# Patient Record
Sex: FEMALE | Race: WHITE | NOT HISPANIC OR LATINO | Employment: UNEMPLOYED | URBAN - NONMETROPOLITAN AREA
[De-identification: names, ages, dates, MRNs, and addresses within clinical notes are randomized per-mention and may not be internally consistent; named-entity substitution may affect disease eponyms.]

---

## 2017-02-01 ENCOUNTER — TRANSCRIBE ORDERS (OUTPATIENT)
Dept: ADMINISTRATIVE | Facility: HOSPITAL | Age: 62
End: 2017-02-01

## 2017-02-01 DIAGNOSIS — N64.89 BREAST ASYMMETRY IN FEMALE: Primary | ICD-10-CM

## 2017-02-13 ENCOUNTER — HOSPITAL ENCOUNTER (OUTPATIENT)
Dept: ULTRASOUND IMAGING | Facility: HOSPITAL | Age: 62
Discharge: HOME | End: 2017-02-13

## 2017-02-13 ENCOUNTER — HOSPITAL ENCOUNTER (OUTPATIENT)
Dept: MAMMOGRAPHY | Facility: HOSPITAL | Age: 62
Discharge: HOME | End: 2017-02-13
Admitting: PHYSICIAN ASSISTANT

## 2017-02-13 DIAGNOSIS — N64.89 BREAST ASYMMETRY IN FEMALE: ICD-10-CM

## 2017-02-13 PROCEDURE — G0204 DX MAMMO INCL CAD BI: HCPCS

## 2017-02-13 PROCEDURE — 77062 BREAST TOMOSYNTHESIS BI: CPT | Performed by: RADIOLOGY

## 2017-02-13 PROCEDURE — G0279 TOMOSYNTHESIS, MAMMO: HCPCS

## 2017-02-13 PROCEDURE — 77066 DX MAMMO INCL CAD BI: CPT | Performed by: RADIOLOGY

## 2018-04-13 ENCOUNTER — HOSPITAL ENCOUNTER (OUTPATIENT)
Dept: MAMMOGRAPHY | Facility: HOSPITAL | Age: 63
Discharge: HOME | End: 2018-04-13
Admitting: PHYSICIAN ASSISTANT

## 2018-04-13 DIAGNOSIS — Z13.9 VISIT FOR SCREENING: ICD-10-CM

## 2018-04-13 PROCEDURE — 77063 BREAST TOMOSYNTHESIS BI: CPT

## 2018-04-13 PROCEDURE — 77067 SCR MAMMO BI INCL CAD: CPT | Performed by: RADIOLOGY

## 2018-04-13 PROCEDURE — 77067 SCR MAMMO BI INCL CAD: CPT

## 2018-04-13 PROCEDURE — 77063 BREAST TOMOSYNTHESIS BI: CPT | Performed by: RADIOLOGY

## 2019-04-15 ENCOUNTER — HOSPITAL ENCOUNTER (OUTPATIENT)
Dept: MAMMOGRAPHY | Facility: HOSPITAL | Age: 64
Discharge: HOME | End: 2019-04-15
Admitting: CLINIC/CENTER

## 2019-04-15 DIAGNOSIS — Z12.39 SCREENING BREAST EXAMINATION: ICD-10-CM

## 2019-04-15 PROCEDURE — 77067 SCR MAMMO BI INCL CAD: CPT

## 2019-04-15 PROCEDURE — 77067 SCR MAMMO BI INCL CAD: CPT | Performed by: RADIOLOGY

## 2019-04-15 PROCEDURE — 77063 BREAST TOMOSYNTHESIS BI: CPT | Performed by: RADIOLOGY

## 2019-04-15 PROCEDURE — 77063 BREAST TOMOSYNTHESIS BI: CPT

## 2020-06-01 ENCOUNTER — HOSPITAL ENCOUNTER (OUTPATIENT)
Dept: MAMMOGRAPHY | Facility: HOSPITAL | Age: 65
Discharge: HOME | End: 2020-06-01
Admitting: CLINIC/CENTER

## 2020-06-01 DIAGNOSIS — Z12.31 VISIT FOR SCREENING MAMMOGRAM: ICD-10-CM

## 2020-06-01 PROCEDURE — 77063 BREAST TOMOSYNTHESIS BI: CPT | Performed by: RADIOLOGY

## 2020-06-01 PROCEDURE — 77063 BREAST TOMOSYNTHESIS BI: CPT

## 2020-06-01 PROCEDURE — 77067 SCR MAMMO BI INCL CAD: CPT

## 2020-06-01 PROCEDURE — 77067 SCR MAMMO BI INCL CAD: CPT | Performed by: RADIOLOGY

## 2021-07-19 ENCOUNTER — HOSPITAL ENCOUNTER (OUTPATIENT)
Dept: MAMMOGRAPHY | Facility: HOSPITAL | Age: 66
Discharge: HOME | End: 2021-07-19
Admitting: CLINIC/CENTER

## 2021-07-19 DIAGNOSIS — Z12.31 VISIT FOR SCREENING MAMMOGRAM: ICD-10-CM

## 2021-07-19 PROCEDURE — 77067 SCR MAMMO BI INCL CAD: CPT | Performed by: RADIOLOGY

## 2021-07-19 PROCEDURE — 77067 SCR MAMMO BI INCL CAD: CPT

## 2021-07-19 PROCEDURE — 77063 BREAST TOMOSYNTHESIS BI: CPT

## 2021-07-19 PROCEDURE — 77063 BREAST TOMOSYNTHESIS BI: CPT | Performed by: RADIOLOGY

## 2022-09-23 ENCOUNTER — HOSPITAL ENCOUNTER (OUTPATIENT)
Dept: MAMMOGRAPHY | Facility: HOSPITAL | Age: 67
Discharge: HOME | End: 2022-09-23
Admitting: CLINIC/CENTER

## 2022-09-23 DIAGNOSIS — Z12.31 VISIT FOR SCREENING MAMMOGRAM: ICD-10-CM

## 2022-09-23 PROCEDURE — 77067 SCR MAMMO BI INCL CAD: CPT | Performed by: RADIOLOGY

## 2022-09-23 PROCEDURE — 77063 BREAST TOMOSYNTHESIS BI: CPT | Performed by: RADIOLOGY

## 2022-09-23 PROCEDURE — 77067 SCR MAMMO BI INCL CAD: CPT

## 2022-09-23 PROCEDURE — 77063 BREAST TOMOSYNTHESIS BI: CPT

## 2025-02-20 ENCOUNTER — OFFICE VISIT (OUTPATIENT)
Dept: GASTROENTEROLOGY | Facility: CLINIC | Age: 70
End: 2025-02-20
Payer: MEDICARE

## 2025-02-20 VITALS
WEIGHT: 173 LBS | SYSTOLIC BLOOD PRESSURE: 108 MMHG | DIASTOLIC BLOOD PRESSURE: 58 MMHG | HEART RATE: 65 BPM | HEIGHT: 63 IN | BODY MASS INDEX: 30.65 KG/M2

## 2025-02-20 DIAGNOSIS — R19.8 ALTERNATING CONSTIPATION AND DIARRHEA: ICD-10-CM

## 2025-02-20 DIAGNOSIS — K92.1 HEMATOCHEZIA: ICD-10-CM

## 2025-02-20 DIAGNOSIS — Z12.11 ENCOUNTER FOR SCREENING FOR MALIGNANT NEOPLASM OF COLON: Primary | ICD-10-CM

## 2025-02-20 DIAGNOSIS — K62.5 RECTAL BLEEDING: ICD-10-CM

## 2025-02-20 DIAGNOSIS — Z87.19 HISTORY OF DIVERTICULITIS: ICD-10-CM

## 2025-02-20 PROCEDURE — 83540 ASSAY OF IRON: CPT | Performed by: NURSE PRACTITIONER

## 2025-02-20 PROCEDURE — 85025 COMPLETE CBC W/AUTO DIFF WBC: CPT | Performed by: NURSE PRACTITIONER

## 2025-02-20 PROCEDURE — 84466 ASSAY OF TRANSFERRIN: CPT | Performed by: NURSE PRACTITIONER

## 2025-02-20 PROCEDURE — 82728 ASSAY OF FERRITIN: CPT | Performed by: NURSE PRACTITIONER

## 2025-02-20 RX ORDER — ASPIRIN 81 MG/1
81 TABLET ORAL DAILY
COMMUNITY

## 2025-02-20 RX ORDER — POLYETHYLENE GLYCOL 3350 17 G/17G
510 POWDER, FOR SOLUTION ORAL TAKE AS DIRECTED
Qty: 510 G | Refills: 0 | Status: SHIPPED | OUTPATIENT
Start: 2025-02-20

## 2025-02-20 RX ORDER — ATORVASTATIN CALCIUM 10 MG/1
10 TABLET, FILM COATED ORAL DAILY
COMMUNITY

## 2025-02-20 RX ORDER — HYDROCHLOROTHIAZIDE 50 MG/1
50 TABLET ORAL DAILY
COMMUNITY
Start: 2024-11-16

## 2025-02-20 RX ORDER — BISACODYL 5 MG/1
20 TABLET, DELAYED RELEASE ORAL ONCE
Qty: 4 TABLET | Refills: 0 | Status: SHIPPED | OUTPATIENT
Start: 2025-02-20 | End: 2025-02-20

## 2025-02-20 RX ORDER — LOSARTAN POTASSIUM 100 MG/1
100 TABLET ORAL DAILY
COMMUNITY

## 2025-02-20 RX ORDER — METOPROLOL TARTRATE 50 MG
50 TABLET ORAL 2 TIMES DAILY
COMMUNITY
Start: 2025-01-14

## 2025-02-20 RX ORDER — DILTIAZEM HYDROCHLORIDE 180 MG/1
180 CAPSULE, COATED, EXTENDED RELEASE ORAL DAILY
COMMUNITY
Start: 2024-12-18 | End: 2025-12-18

## 2025-02-20 RX ORDER — DULOXETIN HYDROCHLORIDE 30 MG/1
30 CAPSULE, DELAYED RELEASE ORAL DAILY
COMMUNITY

## 2025-02-20 NOTE — PROGRESS NOTES
DATE OF CONSULTATION:  2/20/2025    REASON FOR REFERRAL: Constipation, history of diverticulitis, hematochezia, rectal bleeding    REFERRING PHYSICIAN:  Mily Glynn APRN     CHIEF COMPLAINT:  Hematochezia, rectal bleeding, loose stool    HISTORY OF PRESENT ILLNESS:   Conchita Jim is a very pleasant 69 y.o. female who is being seen today at the request of Mily Glynn APRN  for evaluation and treatment of constipation, hematochezia, rectal bleeding and diverticulitis. Ms. Jim reports having chronic difficulty with constipation. She has previously tried over-the-counter stool softeners and laxatives without improvement.  Approximately 6 months ago, patient reports having worsening constipation and subsequently began having intermittent hematochezia and rectal bleeding a few times per week.  She reports that she continued to follow with her PCP but did not have improvement in symptoms with stool softeners.  In mid December 2024, patient reports she began having LLQ abdominal pain.  She underwent CT abdomen pelvis without contrast on 12/20/2024 which showed uncomplicated sigmoid diverticulitis.  Follow-up endoscopic correlation was recommended.  Patient reports completing treatment for diverticulitis with improvement in LLQ abdominal pain and constipation.  At present, she is not taking any stool softeners.  She reports having 5-6 BMs per day with stool type V-VII on [place] stool scale.  However, she has associated abdominal bloating and incomplete evacuation of her colon.  She continues to have mild hematochezia and rectal bleeding~1-2 times per week.  Patient is not aware of being anemic.  She reports having previous colonoscopy~4 to 5 years ago at [facility] with Dr. Bruce which was unremarkable.  She denies family history of colon cancer.  She denies unusual weight loss.  She has no other complaints today.    PAST MEDICAL HISTORY:  Past Medical History:   Diagnosis Date     "Diverticulitis of colon     Hyperlipidemia     Hypertension        PAST SURGICAL HISTORY:  Past Surgical History:   Procedure Laterality Date    HYSTERECTOMY      10 yrs ago       FAMILY HISTORY:  Family History   Problem Relation Age of Onset    Breast cancer Maternal Aunt 55       SOCIAL HISTORY:  Social History     Socioeconomic History    Marital status:        MEDICATIONS:  The current medication list was reviewed in the EMR    Current Outpatient Medications:     dilTIAZem CD (CARDIZEM CD) 180 MG 24 hr capsule, Take 1 capsule by mouth Daily., Disp: , Rfl:     hydroCHLOROthiazide 50 MG tablet, Take 1 tablet by mouth Daily., Disp: , Rfl:     metoprolol tartrate (LOPRESSOR) 50 MG tablet, Take 1 tablet by mouth 2 (Two) Times a Day., Disp: , Rfl:     aspirin 81 MG EC tablet, Take 1 tablet by mouth Daily., Disp: , Rfl:     atorvastatin (LIPITOR) 10 MG tablet, Take 1 tablet by mouth Daily., Disp: , Rfl:     bisacodyl (DULCOLAX) 5 MG EC tablet, Take 4 tablets by mouth 1 (One) Time for 1 dose. Take 4 tablets at 4:00 PM the day before procedure, Disp: 4 tablet, Rfl: 0    DULoxetine (CYMBALTA) 30 MG capsule, Take 1 capsule by mouth Daily., Disp: , Rfl:     losartan (COZAAR) 100 MG tablet, Take 1 tablet by mouth Daily., Disp: , Rfl:     polyethylene glycol (MIRALAX) 17 GM/SCOOP powder, Take 510 g by mouth Take As Directed. Place 15 cap fulls of powder in 32 oz of liquid of choice at 4:00 and 10:00 PM, Disp: 510 g, Rfl: 0    ALLERGIES:  No Known Allergies      REVIEW OF SYSTEMS:    A comprehensive 14 point review of systems was performed.  Significant findings as mentioned above.  All other systems reviewed and are negative.        Physical Exam   Vital Signs: /58 (BP Location: Left arm, Patient Position: Sitting, Cuff Size: Large Adult)   Pulse 65   Ht 160 cm (63\")   Wt 78.5 kg (173 lb)   BMI 30.65 kg/m²    General: Well developed, well nourished, alert and oriented x 3, in no acute distress.   Head: ATNC "   Eyes: PERRL, No evidence of conjunctivitis.   Nose: No nasal discharge.   Mouth: Oral mucosal membranes moist. No oral ulceration or hemorrhages.   Neck: Neck supple. No thyromegaly. No JVD.   Lungs: Clear in all fields to A&P without rales, rhonchi or wheezing.   Heart:  Regular rate and rhythm. No murmurs, rubs, or gallops.   Abdomen: Soft. Bowel sounds are normoactive. Nontender with palpation.   Extremities: No cyanosis or edema.   Neurologic: Grossly non-focal exam      IMAGING:   CT Abdomen Pelvis Without Contrast  Order: [id number]  Impression    Uncomplicated sigmoid diverticulitis. Follow-up endoscopic  correlation recommended.          Images reviewed, interpreted, and dictated by Dr. NIKKIE Stauffer.  Transcribed by Nelson Don PA-C.  Narrative    CT SCAN OF ABDOMEN AND PELVIS WITHOUT CONTRAST    HISTORY: Right upper quadrant pain    PROCEDURE: Axial images were obtained from the lung bases to the pubic  symphysis by computed tomography.  This study was performed with  techniques to keep radiation doses as low as reasonably achievable,  (ALARA). Individualized dose reduction techniques using automated  exposure control or adjustment of mA and/or kV according to the patient  size were employed.      FINDINGS:    ABDOMEN: The lung bases are clear. The heart size is normal. The limited  noncontrast images of the liver are normal except for a 30 mm hepatic  cyst. The gallbladder is mildly distended. The spleen is normal. No  adrenal masses are seen.  The aorta is normal in caliber. There is no  significant free fluid or adenopathy.   There is no nephrolithiasis or  hydronephrosis.    PELVIS: The appendix is not identified. There is diverticulosis in  sigmoid colon with mild surrounding inflammation. There is no abscess or  free air. The urinary bladder is unremarkable. There is no significant  fluid or adenopathy.  Exam End: 12/20/24 11:14    Specimen Collected: 12/20/24 16:40 Last Resulted: 12/20/24  16:46   Received From: [facility]  Result Received: 02/20/25 13:18       ASSESSMENT & PLAN:  Conchita Jim is a very pleasant 69 y.o. female with    1.  History of diverticulitis:   2.  Alternating constipation and diarrhea:  3.  Hematochezia/rectal bleeding:    -Recommended colonoscopy to evaluate the above issues.  We discussed risks/benefits and patient is agreeable to proceed.  Will schedule.  -Will check CBC, iron panel and ferritin today.  -Recommended patient start daily fiber supplement to help promote bowel regularity and reduce pressure in her colon.  Recommended starting Citrucel or Benefiber.  Also recommended she push oral hydration with water to help prevent constipation.  She can also use MiraLAX 1 capful daily as needed to help prevent straining which can exacerbate diverticular disease.  -Will have patient return to clinic following colonoscopy.    The patient was in agreement with the plan and all questions were answered to her satisfaction.     Thank you so much for allowing us to participate in the care of Conchita Jim . Please do not hesitate to contact us with any questions or concerns.           Electronically Signed by: NANCY Telles , February 20, 2025 11:21 EST       CC:   Chandana Scott MD

## 2025-02-21 ENCOUNTER — TELEPHONE (OUTPATIENT)
Dept: GASTROENTEROLOGY | Facility: CLINIC | Age: 70
End: 2025-02-21
Payer: MEDICARE

## 2025-02-21 LAB
BASOPHILS # BLD AUTO: 0.09 10*3/MM3 (ref 0–0.2)
BASOPHILS NFR BLD AUTO: 0.8 % (ref 0–1.5)
DEPRECATED RDW RBC AUTO: 40.9 FL (ref 37–54)
EOSINOPHIL # BLD AUTO: 0.51 10*3/MM3 (ref 0–0.4)
EOSINOPHIL NFR BLD AUTO: 4.5 % (ref 0.3–6.2)
ERYTHROCYTE [DISTWIDTH] IN BLOOD BY AUTOMATED COUNT: 12 % (ref 12.3–15.4)
FERRITIN SERPL-MCNC: 555 NG/ML (ref 13–150)
HCT VFR BLD AUTO: 34.3 % (ref 34–46.6)
HGB BLD-MCNC: 11.2 G/DL (ref 12–15.9)
IMM GRANULOCYTES # BLD AUTO: 0.08 10*3/MM3 (ref 0–0.05)
IMM GRANULOCYTES NFR BLD AUTO: 0.7 % (ref 0–0.5)
IRON 24H UR-MRATE: 50 MCG/DL (ref 37–145)
IRON SATN MFR SERPL: 18 % (ref 20–50)
LYMPHOCYTES # BLD AUTO: 1.59 10*3/MM3 (ref 0.7–3.1)
LYMPHOCYTES NFR BLD AUTO: 14 % (ref 19.6–45.3)
MCH RBC QN AUTO: 30.1 PG (ref 26.6–33)
MCHC RBC AUTO-ENTMCNC: 32.7 G/DL (ref 31.5–35.7)
MCV RBC AUTO: 92.2 FL (ref 79–97)
MONOCYTES # BLD AUTO: 1 10*3/MM3 (ref 0.1–0.9)
MONOCYTES NFR BLD AUTO: 8.8 % (ref 5–12)
NEUTROPHILS NFR BLD AUTO: 71.2 % (ref 42.7–76)
NEUTROPHILS NFR BLD AUTO: 8.06 10*3/MM3 (ref 1.7–7)
NRBC BLD AUTO-RTO: 0 /100 WBC (ref 0–0.2)
PLATELET # BLD AUTO: 360 10*3/MM3 (ref 140–450)
PMV BLD AUTO: 9.9 FL (ref 6–12)
RBC # BLD AUTO: 3.72 10*6/MM3 (ref 3.77–5.28)
TIBC SERPL-MCNC: 274 MCG/DL (ref 298–536)
TRANSFERRIN SERPL-MCNC: 184 MG/DL (ref 200–360)
WBC NRBC COR # BLD AUTO: 11.33 10*3/MM3 (ref 3.4–10.8)

## 2025-02-21 NOTE — TELEPHONE ENCOUNTER
I spoke to patient, notified her that per Genoveva, Please let patient know that labs from yesterday including CBC showed that she is mildly anemic.  Hemoglobin is currently 11.2.  Iron panel and ferritin not suggestive of iron deficiency anemia and most suggestive of anemia of chronic disease/inflammation.  She does not need oral iron therapy, ferritin is currently elevated 555.  Will proceed with colonoscopy as previously planned. Patient voiced understanding.

## 2025-02-21 NOTE — TELEPHONE ENCOUNTER
Please let patient know that labs from yesterday including CBC showed that she is mildly anemic.  Hemoglobin is currently 11.2.  Iron panel and ferritin not suggestive of iron deficiency anemia and most suggestive of anemia of chronic disease/inflammation.  She does not need oral iron therapy, ferritin is currently elevated 555.  Will proceed with colonoscopy as previously planned.

## 2025-04-16 ENCOUNTER — ANESTHESIA EVENT (OUTPATIENT)
Dept: PERIOP | Facility: HOSPITAL | Age: 70
End: 2025-04-16
Payer: MEDICARE

## 2025-04-16 ENCOUNTER — HOSPITAL ENCOUNTER (OUTPATIENT)
Facility: HOSPITAL | Age: 70
Setting detail: HOSPITAL OUTPATIENT SURGERY
Discharge: HOME | End: 2025-04-16
Attending: INTERNAL MEDICINE | Admitting: INTERNAL MEDICINE
Payer: MEDICARE

## 2025-04-16 ENCOUNTER — ANESTHESIA (OUTPATIENT)
Dept: PERIOP | Facility: HOSPITAL | Age: 70
End: 2025-04-16
Payer: MEDICARE

## 2025-04-16 VITALS
DIASTOLIC BLOOD PRESSURE: 72 MMHG | WEIGHT: 178 LBS | HEIGHT: 63 IN | BODY MASS INDEX: 31.54 KG/M2 | HEART RATE: 58 BPM | OXYGEN SATURATION: 98 % | RESPIRATION RATE: 16 BRPM | TEMPERATURE: 97.3 F | SYSTOLIC BLOOD PRESSURE: 142 MMHG

## 2025-04-16 DIAGNOSIS — K62.5 RECTAL BLEEDING: ICD-10-CM

## 2025-04-16 DIAGNOSIS — K92.1 HEMATOCHEZIA: ICD-10-CM

## 2025-04-16 DIAGNOSIS — Z12.11 ENCOUNTER FOR SCREENING FOR MALIGNANT NEOPLASM OF COLON: ICD-10-CM

## 2025-04-16 DIAGNOSIS — Z87.19 HISTORY OF DIVERTICULITIS: ICD-10-CM

## 2025-04-16 PROCEDURE — 25010000002 PROPOFOL 200 MG/20ML EMULSION: Performed by: NURSE ANESTHETIST, CERTIFIED REGISTERED

## 2025-04-16 PROCEDURE — 25810000003 LACTATED RINGERS PER 1000 ML: Performed by: ANESTHESIOLOGY

## 2025-04-16 PROCEDURE — 25010000002 LIDOCAINE PF 2% 2 % SOLUTION: Performed by: NURSE ANESTHETIST, CERTIFIED REGISTERED

## 2025-04-16 RX ORDER — FENTANYL CITRATE 50 UG/ML
50 INJECTION, SOLUTION INTRAMUSCULAR; INTRAVENOUS
Status: DISCONTINUED | OUTPATIENT
Start: 2025-04-16 | End: 2025-04-16 | Stop reason: HOSPADM

## 2025-04-16 RX ORDER — IPRATROPIUM BROMIDE AND ALBUTEROL SULFATE 2.5; .5 MG/3ML; MG/3ML
3 SOLUTION RESPIRATORY (INHALATION) ONCE AS NEEDED
Status: DISCONTINUED | OUTPATIENT
Start: 2025-04-16 | End: 2025-04-16 | Stop reason: HOSPADM

## 2025-04-16 RX ORDER — SODIUM CHLORIDE, SODIUM LACTATE, POTASSIUM CHLORIDE, CALCIUM CHLORIDE 600; 310; 30; 20 MG/100ML; MG/100ML; MG/100ML; MG/100ML
100 INJECTION, SOLUTION INTRAVENOUS ONCE AS NEEDED
Status: DISCONTINUED | OUTPATIENT
Start: 2025-04-16 | End: 2025-04-16 | Stop reason: HOSPADM

## 2025-04-16 RX ORDER — MIDAZOLAM HYDROCHLORIDE 1 MG/ML
0.5 INJECTION, SOLUTION INTRAMUSCULAR; INTRAVENOUS
Status: DISCONTINUED | OUTPATIENT
Start: 2025-04-16 | End: 2025-04-16 | Stop reason: HOSPADM

## 2025-04-16 RX ORDER — MEPERIDINE HYDROCHLORIDE 25 MG/ML
12.5 INJECTION INTRAMUSCULAR; INTRAVENOUS; SUBCUTANEOUS
Status: DISCONTINUED | OUTPATIENT
Start: 2025-04-16 | End: 2025-04-16 | Stop reason: HOSPADM

## 2025-04-16 RX ORDER — SODIUM CHLORIDE 0.9 % (FLUSH) 0.9 %
10 SYRINGE (ML) INJECTION AS NEEDED
Status: DISCONTINUED | OUTPATIENT
Start: 2025-04-16 | End: 2025-04-16 | Stop reason: HOSPADM

## 2025-04-16 RX ORDER — MIDAZOLAM HYDROCHLORIDE 1 MG/ML
0.5 INJECTION, SOLUTION INTRAMUSCULAR; INTRAVENOUS
Status: DISCONTINUED | OUTPATIENT
Start: 2025-04-16 | End: 2025-04-16 | Stop reason: SDUPTHER

## 2025-04-16 RX ORDER — ONDANSETRON 2 MG/ML
4 INJECTION INTRAMUSCULAR; INTRAVENOUS AS NEEDED
Status: DISCONTINUED | OUTPATIENT
Start: 2025-04-16 | End: 2025-04-16 | Stop reason: HOSPADM

## 2025-04-16 RX ORDER — SODIUM CHLORIDE, SODIUM LACTATE, POTASSIUM CHLORIDE, CALCIUM CHLORIDE 600; 310; 30; 20 MG/100ML; MG/100ML; MG/100ML; MG/100ML
125 INJECTION, SOLUTION INTRAVENOUS ONCE
Status: COMPLETED | OUTPATIENT
Start: 2025-04-16 | End: 2025-04-16

## 2025-04-16 RX ORDER — PROPOFOL 10 MG/ML
INJECTION, EMULSION INTRAVENOUS AS NEEDED
Status: DISCONTINUED | OUTPATIENT
Start: 2025-04-16 | End: 2025-04-16 | Stop reason: SURG

## 2025-04-16 RX ORDER — SODIUM CHLORIDE 9 MG/ML
40 INJECTION, SOLUTION INTRAVENOUS AS NEEDED
Status: DISCONTINUED | OUTPATIENT
Start: 2025-04-16 | End: 2025-04-16 | Stop reason: HOSPADM

## 2025-04-16 RX ORDER — SODIUM CHLORIDE 0.9 % (FLUSH) 0.9 %
10 SYRINGE (ML) INJECTION EVERY 12 HOURS SCHEDULED
Status: DISCONTINUED | OUTPATIENT
Start: 2025-04-16 | End: 2025-04-16 | Stop reason: HOSPADM

## 2025-04-16 RX ORDER — SODIUM CHLORIDE, SODIUM LACTATE, POTASSIUM CHLORIDE, CALCIUM CHLORIDE 600; 310; 30; 20 MG/100ML; MG/100ML; MG/100ML; MG/100ML
125 INJECTION, SOLUTION INTRAVENOUS ONCE
Status: DISCONTINUED | OUTPATIENT
Start: 2025-04-16 | End: 2025-04-16 | Stop reason: HOSPADM

## 2025-04-16 RX ORDER — OXYCODONE AND ACETAMINOPHEN 5; 325 MG/1; MG/1
1 TABLET ORAL ONCE AS NEEDED
Status: DISCONTINUED | OUTPATIENT
Start: 2025-04-16 | End: 2025-04-16 | Stop reason: HOSPADM

## 2025-04-16 RX ORDER — LIDOCAINE HYDROCHLORIDE 20 MG/ML
INJECTION, SOLUTION EPIDURAL; INFILTRATION; INTRACAUDAL; PERINEURAL AS NEEDED
Status: DISCONTINUED | OUTPATIENT
Start: 2025-04-16 | End: 2025-04-16 | Stop reason: SURG

## 2025-04-16 RX ADMIN — PROPOFOL 150 MCG/KG/MIN: 10 INJECTION, EMULSION INTRAVENOUS at 08:46

## 2025-04-16 RX ADMIN — PROPOFOL 50 MG: 10 INJECTION, EMULSION INTRAVENOUS at 08:45

## 2025-04-16 RX ADMIN — SODIUM CHLORIDE, POTASSIUM CHLORIDE, SODIUM LACTATE AND CALCIUM CHLORIDE: 600; 310; 30; 20 INJECTION, SOLUTION INTRAVENOUS at 08:43

## 2025-04-16 RX ADMIN — LIDOCAINE HYDROCHLORIDE 60 MG: 20 INJECTION, SOLUTION EPIDURAL; INFILTRATION; INTRACAUDAL; PERINEURAL at 08:45

## 2025-04-16 NOTE — H&P
[facility] HOSPITALIST HISTORY AND PHYSICAL    Patient Identification:  Name:  Conchita Jim  Age:  69 y.o.  Sex:  female  :  1955  MRN:  [id number]   Visit Number:  [id number]  Primary Care Physician:  Mily Glynn APRN       Chief complaint: frequent diverticulitis, Hematochezia, abdominal pain     History of presenting illness:  69 y.o. female who presents today for colonoscopy for further evaluation of diverticulitis flares, hematochezia, and abdominal pain. Patient reports struggling with constipation in the past. She had tried several OTC stool softeners and laxatives without relief. She was initiated on Fibercon and reports that her constipation has improved. In mid 2024, patient reports she began having LLQ abdominal pain. She underwent CT abdomen pelvis without contrast on 2024 which showed uncomplicated sigmoid diverticulitis. Follow-up endoscopic correlation was recommended. Patient reports completing treatment for diverticulitis with improvement in LLQ abdominal pain and constipation. Of note, patient reports having LLQ abdominal pain, diarrhea, and hematochezia in 2025. She subsequently underwent CT abdomen pelvis as an outpatient.. Patient reports that this was notable for diverticulitis and she was initiated on treatment. She notes improvement in her pain and hematochezia. She has not had hematochezia x2 weeks. Today, she notes having frequent nausea and vomiting. Patient reports weekly vomiting of coffee-ground emesis. States that this has been happening for several months. However, this has not occurred in 2 weeks.    Review of Systems   Constitutional: Negative.  Negative for unexpected weight change.   HENT: Negative.     Respiratory: Negative.     Cardiovascular: Negative.    Gastrointestinal:  Positive for abdominal pain, anal bleeding, blood in stool, constipation, nausea and vomiting. Negative for abdominal distention, diarrhea and rectal pain.    All other systems reviewed and are negative.       Past Medical History:   Diagnosis Date    Diverticulitis of colon     Hyperlipidemia     Hypertension      Past Surgical History:   Procedure Laterality Date    APPENDECTOMY      HYSTERECTOMY      10 yrs ago     Family History   Problem Relation Age of Onset    No Known Problems Mother     No Known Problems Father     No Known Problems Sister     No Known Problems Brother     Breast cancer Maternal Aunt 55    No Known Problems Maternal Uncle     No Known Problems Paternal Aunt     No Known Problems Paternal Uncle     No Known Problems Maternal Grandmother     No Known Problems Maternal Grandfather     No Known Problems Paternal Grandmother     No Known Problems Paternal Grandfather     No Known Problems Other      Social History     Socioeconomic History    Marital status:    Tobacco Use    Smoking status: Never    Smokeless tobacco: Never   Substance and Sexual Activity    Alcohol use: Never    Drug use: Never    Sexual activity: Defer       Allergies:  Patient has no known allergies.    Prior to Admission Medications       Prescriptions Last Dose Informant Patient Reported? Taking?    aspirin 81 MG EC tablet Past Week  Yes Yes    Take 1 tablet by mouth Daily.    atorvastatin (LIPITOR) 10 MG tablet 4/15/2025  Yes Yes    Take 1 tablet by mouth Daily.    dilTIAZem CD (CARDIZEM CD) 180 MG 24 hr capsule 4/15/2025  Yes Yes    Take 1 capsule by mouth Daily.    DULoxetine (CYMBALTA) 30 MG capsule 4/15/2025  Yes Yes    Take 1 capsule by mouth Daily.    hydroCHLOROthiazide 50 MG tablet 4/15/2025  Yes Yes    Take 0.5 tablets by mouth Daily.    losartan (COZAAR) 100 MG tablet 4/16/2025  Yes Yes    Take 1 tablet by mouth Daily.    metoprolol tartrate (LOPRESSOR) 50 MG tablet 4/16/2025  Yes Yes    Take 1 tablet by mouth 2 (Two) Times a Day.    polyethylene glycol (MIRALAX) 17 GM/SCOOP powder 4/15/2025  No Yes    Take 510 g by mouth Take As Directed. Place 15 cap fulls  "of powder in 32 oz of liquid of choice at 4:00 and 10:00 PM          Hospital Scheduled Meds:  lactated ringers, 125 mL/hr, Intravenous, Once  lactated ringers, 125 mL/hr, Intravenous, Once  sodium chloride, 10 mL, Intravenous, Q12H  sodium chloride, 10 mL, Intravenous, Q12H           Vital Signs:  Temp:  [97.1 °F (36.2 °C)] 97.1 °F (36.2 °C)  Heart Rate:  [69] 69  Resp:  [16] 16  BP: (140)/(72) 140/72      04/16/25  0800   Weight: 80.7 kg (178 lb)     Body mass index is 31.53 kg/m².    Physical Exam:  Constitutional:  Alert and oriented. Well developed and well nourished, in no acute distress.  HENT:  Head: Normocephalic and atraumatic.  Mouth:  Moist mucous membranes.  OP clear, mmm  Eyes:  Conjunctivae and EOM are normal.  Pupils are equal, round, and reactive to light.  No scleral icterus.  Neck:  Neck supple.  No JVD present.    Cardiovascular:  RRR, no MRG.  Pulmonary/Chest:  CTAB, unlabored.   Abdominal:  Soft.  Bowel sounds are normal.  No distension and no tenderness.   Psychiatric:  Normal mood and affect.  Behavior is normal.  Judgment and thought content normal.                     Invalid input(s): \"PROT\"CrCl cannot be calculated (No successful lab value found.).  No results found for: \"AMMONIA\"          No results found for: \"HGBA1C\"  No results found for: \"TSH\", \"FREET4\"  No results found for: \"PREGTESTUR\", \"PREGSERUM\", \"HCG\", \"HCGQUANT\"  Pain Management Panel           No data to display              No results found for: \"BLOODCX\"  No results found for: \"URINECX\"  No results found for: \"WOUNDCX\"  No results found for: \"STOOLCX\"        Imaging Results (Last 7 Days)       ** No results found for the last 168 hours. **              Assessment and Plan:    Proceed with colonoscopy for further evaluation of diverticulitis, hematocheiza, and abdominal pain.  Patient was offered EGD as well given reported coffee-ground emesis in setting of anemia. However, patient declines procedure. She was educated on " risks of deferring and verbalized understanding.     Miriam Jones PA-C  04/16/25  08:31 EDT

## 2025-04-16 NOTE — ANESTHESIA POSTPROCEDURE EVALUATION
Patient: Conchita Jim    Procedure Summary       Date: 04/16/25 Room / Location: [facility] OR [address] [place] [address] COR OR    Anesthesia Start: 0843 Anesthesia Stop: 0905    Procedure: COLONOSCOPY Diagnosis:       Encounter for screening for malignant neoplasm of colon      History of diverticulitis      Hematochezia      Rectal bleeding      (Encounter for screening for malignant neoplasm of colon [Z12.11])      (History of diverticulitis [Z87.19])      (Hematochezia [K92.1])      (Rectal bleeding [K62.5])    Surgeons: Elayne Oneal MD Provider: Jesús Moon MD    Anesthesia Type: general ASA Status: 2            Anesthesia Type: general    Vitals  Vitals Value Taken Time   /65 04/16/25 09:10   Temp 97.3 °F (36.3 °C) 04/16/25 09:07   Pulse 57 04/16/25 09:15   Resp 16 04/16/25 09:10   SpO2 100 % 04/16/25 09:15   Vitals shown include unfiled device data.        Post Anesthesia Care and Evaluation    Patient location during evaluation: bedside  Patient participation: complete - patient participated  Level of consciousness: awake and alert  Pain score: 1  Pain management: adequate    Airway patency: patent  Anesthetic complications: No anesthetic complications  PONV Status: none  Cardiovascular status: acceptable  Respiratory status: acceptable  Hydration status: acceptable

## 2025-04-16 NOTE — ANESTHESIA PREPROCEDURE EVALUATION
Anesthesia Evaluation     no history of anesthetic complications:   NPO Solid Status: > 8 hours  NPO Liquid Status: > 8 hours           Airway   Mallampati: I  TM distance: >3 FB  Neck ROM: full  No difficulty expected  Dental - normal exam     Pulmonary - normal exam   Cardiovascular - normal exam    Patient on routine beta blocker and Beta blocker given within 24 hours of surgery    (+) hypertension, hyperlipidemia      Neuro/Psych  GI/Hepatic/Renal/Endo    (+) GI bleeding     Musculoskeletal     Abdominal  - normal exam    Bowel sounds: normal.   Substance History      OB/GYN          Other                      Anesthesia Plan    ASA 2     general     intravenous induction     Anesthetic plan, risks, benefits, and alternatives have been provided, discussed and informed consent has been obtained with: patient.      CODE STATUS:

## 2025-06-03 NOTE — PROGRESS NOTES
DATE:  6/4/2025    REASON FOR FOLLOW UP: Constipation, history of diverticulitis    REFERRING PHYSICIAN:  Mily Glynn APRN     CHIEF COMPLAINT:  Follow-up of colonoscopy    HISTORY OF PRESENT ILLNESS:   Conchita Jim is a very pleasant 69 y.o. female who is being seen today at the request of Mily Glynn APRN  for evaluation and treatment of constipation, hematochezia, rectal bleeding and diverticulitis. Ms. Jim reports having chronic difficulty with constipation. She has previously tried over-the-counter stool softeners and laxatives without improvement.  Approximately 6 months ago, patient reports having worsening constipation and subsequently began having intermittent hematochezia and rectal bleeding a few times per week.  She reports that she continued to follow with her PCP but did not have improvement in symptoms with stool softeners.  In mid December 2024, patient reports she began having LLQ abdominal pain.  She underwent CT abdomen pelvis without contrast on 12/20/2024 which showed uncomplicated sigmoid diverticulitis.  Follow-up endoscopic correlation was recommended.  Patient reports completing treatment for diverticulitis with improvement in LLQ abdominal pain and constipation.  At present, she is not taking any stool softeners.  She reports having 5-6 BMs per day with stool type V-VII on [place] stool scale.  However, she has associated abdominal bloating and incomplete evacuation of her colon.  She continues to have mild hematochezia and rectal bleeding~1-2 times per week.  Patient is not aware of being anemic.  She reports having previous colonoscopy~4 to 5 years ago at [facility] with Dr. Bruce which was unremarkable.  She denies family history of colon cancer.  She denies unusual weight loss.  She has no other complaints today.    INTERVAL HISTORY:  Ms. Jim presents today for follow up.  Since her last visit, she underwent colonoscopy with Dr. Medina on 4/16/2025  which was notable for diverticulosis in the sigmoid colon and in the descending colon along with grade I internal hemorrhoids. Otherwise, unremarkable. Repeat colonoscopy recommended in 10 years for screening.  Following colonoscopy, patient reports having left lower quadrant abdominal pain briefly which later resolved.  She is taking Citrucel daily as advised and reports having a daily BM with stool type IV on [place] stool scale.  She feels as if she is evacuating her colon well. She has no new complaints or concerns today.    PAST MEDICAL HISTORY:  Past Medical History:   Diagnosis Date    Diverticulitis of colon     Hyperlipidemia     Hypertension        PAST SURGICAL HISTORY:  Past Surgical History:   Procedure Laterality Date    APPENDECTOMY      COLONOSCOPY N/A 4/16/2025    Procedure: COLONOSCOPY;  Surgeon: Elayne Oneal MD;  Location: [facility];  Service: Gastroenterology;  Laterality: N/A;    HYSTERECTOMY      10 yrs ago       FAMILY HISTORY:  Family History   Problem Relation Age of Onset    No Known Problems Mother     No Known Problems Father     No Known Problems Sister     No Known Problems Brother     Breast cancer Maternal Aunt 55    No Known Problems Maternal Uncle     No Known Problems Paternal Aunt     No Known Problems Paternal Uncle     No Known Problems Maternal Grandmother     No Known Problems Maternal Grandfather     No Known Problems Paternal Grandmother     No Known Problems Paternal Grandfather     No Known Problems Other        SOCIAL HISTORY:  Social History     Socioeconomic History    Marital status:    Tobacco Use    Smoking status: Never    Smokeless tobacco: Never   Substance and Sexual Activity    Alcohol use: Never    Drug use: Never    Sexual activity: Defer       MEDICATIONS:  The current medication list was reviewed in the EMR    Current Outpatient Medications:     aspirin 81 MG EC tablet, Take 1 tablet by mouth Daily., Disp: , Rfl:     atorvastatin  "(LIPITOR) 10 MG tablet, Take 1 tablet by mouth Daily., Disp: , Rfl:     dilTIAZem CD (CARDIZEM CD) 180 MG 24 hr capsule, Take 1 capsule by mouth Daily., Disp: , Rfl:     DULoxetine (CYMBALTA) 30 MG capsule, Take 1 capsule by mouth Daily., Disp: , Rfl:     hydroCHLOROthiazide 50 MG tablet, Take 0.5 tablets by mouth Daily., Disp: , Rfl:     losartan (COZAAR) 100 MG tablet, Take 1 tablet by mouth Daily., Disp: , Rfl:     metoprolol tartrate (LOPRESSOR) 50 MG tablet, Take 1 tablet by mouth 2 (Two) Times a Day., Disp: , Rfl:     ALLERGIES:  No Known Allergies      REVIEW OF SYSTEMS:    A comprehensive 14 point review of systems was performed.  Significant findings as mentioned above.  All other systems reviewed and are negative.      Physical Exam   Vital Signs: /61 (BP Location: Left arm, Patient Position: Sitting, Cuff Size: Large Adult)   Pulse 60   Ht 160 cm (63\")   Wt 82.1 kg (181 lb)   BMI 32.06 kg/m²    General: Well developed, well nourished, alert and oriented x 3, in no acute distress.   Head: ATNC   Eyes: PERRL, No evidence of conjunctivitis.   Nose: No nasal discharge.   Mouth: Oral mucosal membranes moist. No oral ulceration or hemorrhages.   Neck: Neck supple. No thyromegaly. No JVD.   Lungs: Clear in all fields to A&P without rales, rhonchi or wheezing.   Heart:  Regular rate and rhythm. No murmurs, rubs, or gallops.   Abdomen: Soft. Bowel sounds are normoactive. Nontender with palpation.   Extremities: No cyanosis or edema.   Neurologic: Grossly non-focal exam      IMAGING:   CT Abdomen Pelvis Without Contrast  Order: [id number]  Impression    Uncomplicated sigmoid diverticulitis. Follow-up endoscopic  correlation recommended.          Images reviewed, interpreted, and dictated by Dr. NIKKIE Stauffer.  Transcribed by Nelson Don PA-C.  Narrative    CT SCAN OF ABDOMEN AND PELVIS WITHOUT CONTRAST    HISTORY: Right upper quadrant pain    PROCEDURE: Axial images were obtained from the lung bases " to the pubic  symphysis by computed tomography.  This study was performed with  techniques to keep radiation doses as low as reasonably achievable,  (ALARA). Individualized dose reduction techniques using automated  exposure control or adjustment of mA and/or kV according to the patient  size were employed.      FINDINGS:    ABDOMEN: The lung bases are clear. The heart size is normal. The limited  noncontrast images of the liver are normal except for a 30 mm hepatic  cyst. The gallbladder is mildly distended. The spleen is normal. No  adrenal masses are seen.  The aorta is normal in caliber. There is no  significant free fluid or adenopathy.   There is no nephrolithiasis or  hydronephrosis.    PELVIS: The appendix is not identified. There is diverticulosis in  sigmoid colon with mild surrounding inflammation. There is no abscess or  free air. The urinary bladder is unremarkable. There is no significant  fluid or adenopathy.  Exam End: 12/20/24 11:14    Specimen Collected: 12/20/24 16:40 Last Resulted: 12/20/24 16:46   Received From: [facility] [employer]  Result Received: 02/20/25 13:18       ASSESSMENT & PLAN:  Cnochita Jim is a very pleasant 69 y.o. female with    1.  History of diverticulitis:   2.  Alternating constipation and diarrhea:    -She underwent colonoscopy with Dr. Medina on 4/16/2025 which was notable for diverticulosis in the sigmoid colon and in the descending colon along with grade I internal hemorrhoids. Otherwise, unremarkable. Repeat colonoscopy recommended in 10 years for screening.   -Of note, labs from 2/21/25 including CBC showed Hg 11.2.Iron panel and ferritin not suggestive of iron deficiency anemia and most suggestive of anemia of chronic disease/inflammation.   - At present, clinically she is doing well.  Recommended patient continue daily fiber supplement to help promote bowel regularity and reduce pressure in her colon.  Continue Citrucel which is working well for her.  She was  reminded to push oral hydration with water to help prevent constipation.  She can also use MiraLAX 1 capful daily as needed to help prevent straining which can exacerbate diverticular disease.  - Will have patient return to clinic in 6 months for symptom check or sooner if needed.    The patient was in agreement with the plan and all questions were answered to her satisfaction.     Thank you so much for allowing us to participate in the care of Conchita Jim . Please do not hesitate to contact us with any questions or concerns.           Electronically Signed by: NANCY Telles , Shaina 3, 2025 13:14 EDT       CC:   Mily Glynn APRN

## 2025-06-04 ENCOUNTER — OFFICE VISIT (OUTPATIENT)
Dept: GASTROENTEROLOGY | Facility: CLINIC | Age: 70
End: 2025-06-04
Payer: MEDICARE

## 2025-06-04 VITALS
WEIGHT: 181 LBS | BODY MASS INDEX: 32.07 KG/M2 | HEART RATE: 60 BPM | SYSTOLIC BLOOD PRESSURE: 129 MMHG | HEIGHT: 63 IN | DIASTOLIC BLOOD PRESSURE: 61 MMHG

## 2025-06-04 DIAGNOSIS — R19.8 ALTERNATING CONSTIPATION AND DIARRHEA: ICD-10-CM

## 2025-06-04 DIAGNOSIS — Z87.19 HISTORY OF DIVERTICULITIS: Primary | ICD-10-CM

## (undated) DEVICE — ENDOGATOR AUXILIARY WATER JET CONNECTOR: Brand: ENDOGATOR

## (undated) DEVICE — ENDOGATOR TUBING FOR ENDOGATOR EGP-100 IRRIGATION PUMP,OLYMPUS OFP PUMP, OLYMPUS AFU-100 PUMP AND ERBE EIP2 PUMP: Brand: ENDOGATOR

## (undated) DEVICE — DEFENDO AIR WATER SUCTION AND BIOPSY VALVE KIT FOR  OLYMPUS: Brand: DEFENDO AIR/WATER/SUCTION AND BIOPSY VALVE

## (undated) DEVICE — Device

## (undated) DEVICE — CONN Y IRR DISP 1P/U